# Patient Record
Sex: FEMALE | Race: WHITE
[De-identification: names, ages, dates, MRNs, and addresses within clinical notes are randomized per-mention and may not be internally consistent; named-entity substitution may affect disease eponyms.]

---

## 2018-01-30 ENCOUNTER — HOSPITAL ENCOUNTER (EMERGENCY)
Dept: HOSPITAL 80 - FED | Age: 18
Discharge: HOME | End: 2018-01-30
Payer: COMMERCIAL

## 2018-01-30 VITALS — SYSTOLIC BLOOD PRESSURE: 133 MMHG | DIASTOLIC BLOOD PRESSURE: 78 MMHG | OXYGEN SATURATION: 96 % | HEART RATE: 73 BPM

## 2018-01-30 VITALS — TEMPERATURE: 98.2 F

## 2018-01-30 VITALS — RESPIRATION RATE: 18 BRPM

## 2018-01-30 DIAGNOSIS — S73.102A: Primary | ICD-10-CM
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
## 2018-01-30 NOTE — EDPHY
H & P


Time Seen by Provider: 01/30/18 18:06


HPI/ROS: 





CHIEF COMPLAINT:  Left hip pain





HISTORY OF PRESENT ILLNESS:  17-year-old female presents to the emergency 

department with pain in her left hip.  The patient was skiing 3 days ago and 

fell multiple times.  The patient was able to get up and continue to ski.  It 

was the following day when she noted pain in her left hip.  The patient went to 

[facility] yesterday and had x-rays of her left hip.  She was called 

today and told that she could have a possible subcapital fracture.  She was 

told to come to the emergency department for evaluation.  She states that she 

does not have pain with standing or bearing weight but more has pain with 

movement of her left hip especially when she tries to lift her left leg up and 

crusted over her right.  She denies any other feelings of weakness in her lower 

legs.  Denies numbness or tingling in her toes.  Denies pain in her low back.  

Denies hitting her head or losing consciousness.  No bowel or bladder 

incontinence.





REVIEW OF SYSTEMS:


Constitutional:  No fever, no chills.


Eyes:  No double or blurry vision.


ENT:  No sore throat.


Respiratory:  No cough, no shortness of breath.


Cardiac:  No chest pain.


Gastrointestinal:  No abdominal pain, vomiting or diarrhea.


Genitourinary:  No dysuria.


Musculoskeletal:  Hip pain as above.  No neck or back pain.


Skin:  No rashes.


Neurological:  No headache.


Past Medical/Surgical History: 





Negative


Social History: 





[employer] student


Physical Exam: 





General Appearance:  Alert, no distress. Mentating normally and answering 

questions appropriately.


Eyes:  Pupils equal and round.  Extraocular motions are all intact.


ENT:  Mouth:  Mucous membranes moist.


Respiratory:  No wheezing, rhonchi, or rales, lungs are clear to auscultation.


Cardiovascular:  Regular rate and rhythm.


Gastrointestinal:  Abdomen is soft and nontender, no masses, no rebound or 

guarding, bowel sounds normal.


Neurological:  Alert and oriented x 3, cranial nerves II through XII grossly 

intact


Skin:  Warm and dry, no rashes.


Musculoskeletal:  Nontender to palpate along the cervical, thoracic or lumbar 

spine.  Neck is supple.


Extremities:  No pain with external or internal rotation of her left hip.  She 

does have pain with trying to lift her left straight leg up in the year.  She 

has limited flexion of her left knee due to pain with flexing her left hip.  No 

palpable bony tenderness with palpation to the left hip.  Her pelvis is stable. 

Full range of motion of the right lower extremity upper extremities bilaterally.


Psychiatric:  Patient is oriented X 3, there is no agitation.


Constitutional: 


 Initial Vital Signs











Temperature (C)  36.9 C   01/30/18 18:07


 


Heart Rate  82   01/30/18 18:07


 


Respiratory Rate  18   01/30/18 18:07


 


Blood Pressure  147/77 H  01/30/18 18:07


 


O2 Sat (%)  98   01/30/18 18:07








 











O2 Delivery Mode               Room Air














Allergies/Adverse Reactions: 


 





No Known Allergies Allergy (Unverified 01/30/18 18:11)


 








Home Medications: 














 Medication  Instructions  Recorded


 


NK [No Known Home Meds]  01/30/18














Medical Decision Making





- Diagnostics


Imaging: Discussed imaging studies w/ On call Radiologist


ED Course/Re-evaluation: 





17-year-old female presents with pain in her left hip.  X-rays reveal possible 

subcapital fracture.  The patient however has no pain with bearing weight or 

ambulating.  The patient was specifically sent to the emergency department by 

[facility] and diagnosed with possible subcapital fracture.  I was 

concerned about fracture verses possible soft tissue or ligament injury. MRI of 

the left hip has been ordered and is pending.





MRI of the left hip was essentially normal. No fractures visualized.





The patient will weightbear as tolerated.  She was given orthopedic referral.  

She was encouraged to take anti-inflammatories as needed for pain.


Differential Diagnosis: 





Including but not limited to fracture, dislocation, contusion, sprain, 

musculoskeletal injury





Departure





- Departure


Disposition: Home, Routine, Self-Care


Clinical Impression: 


Sprain of left hip


Qualifiers:


 Encounter type: initial encounter Qualified Code(s): S73.102A - Unspecified 

sprain of left hip, initial encounter





Condition: Good


Instructions:  Hip Sprain (ED)


Additional Instructions: 


Weight bear and activity as tolerated. 





Ibuprofen 600mg every 8 hours for pain as directed.





Referrals: 


Melvin Martínez MD [Medical Doctor] - 5-7 days, call for appt. (Orthopedic 

surgeon on-call)

## 2018-09-23 ENCOUNTER — HOSPITAL ENCOUNTER (EMERGENCY)
Dept: HOSPITAL 80 - FED | Age: 18
Discharge: HOME | End: 2018-09-23
Payer: COMMERCIAL

## 2018-09-23 VITALS — SYSTOLIC BLOOD PRESSURE: 111 MMHG | DIASTOLIC BLOOD PRESSURE: 72 MMHG

## 2018-09-23 DIAGNOSIS — H81.10: Primary | ICD-10-CM

## 2018-09-23 NOTE — EDPHY
H & P


Stated Complaint: vertigo


Time Seen by Provider: 09/23/18 19:25


HPI/ROS: 





CHIEF COMPLAINT:  Vertigo





HISTORY OF PRESENT ILLNESS:  The patient presents to the ED with acute 

positional vertigo that began earlier today with associated nausea.  The 

patient reports typical symptoms of positional vertigo worsened moving her head 

to the right.  The patient denies any history of fall, trauma, neck pain or 

cervical manipulation.  She denies any headache, peripheral numbness, weakness 

or other concerns.  The patient did have a episode of BPV once before in the 

past that responded well to meclizine and Zofran.  The patient denies any 

additional acute complaints.  She otherwise is healthy.





REVIEW OF SYSTEMS:


A comprehensive 10 point review of systems is otherwise negative aside from 

elements mentioned in the history of present illness.


Source: Patient


Exam Limitations: No limitations





- Personal History


LMP (Females 10-55): 15-21 Days Ago


Current Tetanus/Diphtheria Vaccine: Yes


Current Tetanus Diphtheria and Acellular Pertussis (TDAP): Yes





- Medical/Surgical History


Hx Asthma: No


Hx Chronic Respiratory Disease: No


Hx Diabetes: No


Hx Cardiac Disease: No


Hx Renal Disease: No


Hx Cirrhosis: No


Hx Alcoholism: No


Hx HIV/AIDS: No


Hx Splenectomy or Spleen Trauma: No


Other PMH: vertigo





- Social History


Smoking Status: Never smoked





- Physical Exam


Exam: 





General Appearance:  Alert, no distress


Eyes:  Pupils equal and round no pallor or injection


ENT, Mouth:  Mucous membranes moist


Respiratory:  There are no retractions, lungs are clear to auscultation


Cardiovascular:  Regular rate and rhythm


Gastrointestinal:  Abdomen is soft and nontender, no masses, bowel sounds normal


Neurological:  A&O, normal motor function, normal sensory exam, normal cranial 

nerves, horizontal nystagmus


Skin:  Warm and dry, no rashes


Musculoskeletal:  Neck is supple nontender


Extremities:  symmetrical, full range of motion


Psychiatric:  Patient is oriented X 3, there is no agitation


Constitutional: 


 Initial Vital Signs











Temperature (C)  36.4 C   09/23/18 19:15


 


Heart Rate  66   09/23/18 19:15


 


Respiratory Rate  16   09/23/18 19:15


 


Blood Pressure  126/85 H  09/23/18 19:15


 


O2 Sat (%)  98   09/23/18 19:15








 











O2 Delivery Mode               Room Air














Allergies/Adverse Reactions: 


 





No Known Allergies Allergy (Unverified 09/23/18 19:14)


 








Home Medications: 














 Medication  Instructions  Recorded


 


Meclizine HCl [Meclizine HCl 25 mg 25 mg PO BID PRN #20 tab 09/23/18





(RX,OTC)]  


 


Ondansetron Odt [Zofran Odt] 4 mg PO Q4PRN PRN #20 tab 09/23/18














Medical Decision Making


ED Course/Re-evaluation: 





The patient presents to the ED with fairly typical symptoms of benign 

positional vertigo.  Her neurologic examination is otherwise unremarkable.





The patient was treated with Zofran and meclizine orally at 7:45 p.m..





Patient was re-evaluated at 9:00 p.m..  She is feeling better.  Her vertigo is 

not entirely resolved however her nausea and vomiting have.  The patient will 

be discharged home with a prescription for meclizine and Zofran.  She is given 

follow up with ENT for any unimproved symptoms.  She is discharged home with 

customary aftercare instructions and return precautions.








Differential Diagnosis: 





Differential diagnosis considered includes benign positional vertigo, 

labyrinthitis, central vertigo





- Data Points


Medications Given: 


 








Discontinued Medications





Meclizine HCl (Meclizine Hcl)  25 mg PO EDNOW ONE


   Stop: 09/23/18 19:36


   Last Admin: 09/23/18 19:48 Dose:  25 mg


Ondansetron HCl (Zofran Odt)  4 mg PO EDNOW ONE


   Stop: 09/23/18 19:37


   Last Admin: 09/23/18 19:48 Dose:  4 mg


Ondansetron HCl (Zofran Odt 4 Mg Prepack#2)  1 btl TAKEHOME EDNOW ONE


   Stop: 09/23/18 20:22


   Last Admin: 09/23/18 20:35 Dose:  1 btl








Departure





- Departure


Disposition: Home, Routine, Self-Care


Clinical Impression: 


 Vertigo





Condition: Good


Instructions:  Vertigo (ED)


Additional Instructions: 


1.  Meclizine as directed for vertigo.


2. Zofran as needed for nausea.


3. Return to the ED for markedly worsening symptoms, headache, numbness, 

difficulty with speech or other concerns.


4. Please follow up with the ENT physician you have been referred to for any 

ongoing mild symptoms.  


Referrals: 


Shade Penaloza MD [Medical Doctor] - As per Instructions


Prescriptions: 


Meclizine HCl [Meclizine HCl 25 mg (RX,OTC)] 25 mg PO BID PRN #20 tab


 PRN Reason: for dizzyness


Ondansetron Odt [Zofran Odt] 4 mg PO Q4PRN PRN #20 tab


 PRN Reason: For Nausea